# Patient Record
Sex: FEMALE | Race: WHITE | Employment: UNEMPLOYED | URBAN - METROPOLITAN AREA
[De-identification: names, ages, dates, MRNs, and addresses within clinical notes are randomized per-mention and may not be internally consistent; named-entity substitution may affect disease eponyms.]

---

## 2019-01-10 LAB
CREATININE, EXTERNAL: 0.72
LDL-C, EXTERNAL: 94

## 2019-03-28 ENCOUNTER — OFFICE VISIT (OUTPATIENT)
Dept: ENDOCRINOLOGY | Age: 34
End: 2019-03-28

## 2019-03-28 VITALS
BODY MASS INDEX: 27.94 KG/M2 | SYSTOLIC BLOOD PRESSURE: 128 MMHG | RESPIRATION RATE: 16 BRPM | DIASTOLIC BLOOD PRESSURE: 73 MMHG | WEIGHT: 178 LBS | HEART RATE: 86 BPM | HEIGHT: 67 IN

## 2019-03-28 DIAGNOSIS — R79.89 ELEVATED TESTOSTERONE LEVEL IN FEMALE: Primary | ICD-10-CM

## 2019-03-28 NOTE — PROGRESS NOTES
Endocrinology New Patient Visit    Chief Complaint: elevated testosterone level    Referring provider: Karina Macario MD    History of Present Illness:  Zachary Modi is a 35 y.o. female who is referred for an elevated testosterone level. This was checked in January due to an increase in hair growth with heavy cycles and pelvic pain. Total testosterone was 63. Other labs including CBC, CMP, lipids, and TSH were normal.     She denies a previous diagnosis of PCOS. Menarche was at age 8 and she had regular cycles during her teen years. She has been pregnant four times and has four children. Reports difficulty conceiving her first child (required Clomid) but had no problems with fertility after that. She does report very painful ovulation and heavy periods (with large clots) for the past few years. IUD helped this resolve but she is no longer on contraception because her  had a vasectomy. TVUS showed a cyst on her ovary which ruptured in late Feb. F/u US in March showed resolution of the cyst. She says she has always been [place]" but notes faster hair growth lately on her chin in particular- plucks every other day. Denies chest or back hair, or problems with acne. She does have a history of high triglycerides but this has resolved. No h/o GDM or problems with her blood sugar - glucose on her BMP was 90. She does drink a regular soda every day. Weight is fairly stable. She remembers gaining weight due to a two week course of steroids in 2016 but has since lost most of that. She has no personal or family history of adrenal disorders.      Review of Systems: as above, otherwise a 10 pt review is negative     Problem List:  Patient Active Problem List   Diagnosis Code    Post partum depression F53.0    Frequent headaches R51    Bilateral carpal tunnel syndrome G56.03    PVC (premature ventricular contraction) I49.3    Elevated testosterone level in female R79.89       Past Medical History:  Past Medical History: Diagnosis Date    Anxiety     Chest pain     Elevated testosterone level in female     Family history of skin cancer     Headache     Memory loss     Skipped beats     Snoring     Visual disturbance        Past Surgical History:  Past Surgical History:   Procedure Laterality Date    HX DILATION AND CURETTAGE  2012    post-partum    HX ORTHOPAEDIC  2008    HX TONSILLECTOMY         Social History:  Social History     Socioeconomic History    Marital status:      Spouse name: Not on file    Number of children: Not on file    Years of education: Not on file    Highest education level: Not on file   Occupational History    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     Comment: 4 kids (7, 5, 3, 9mo)   Social Needs    Financial resource strain: Not on file    Food insecurity:     Worry: Not on file     Inability: Not on file   [employer] needs:     Medical: Not on file     Non-medical: Not on file   Tobacco Use    Smoking status: Former Smoker     Packs/day: 0.50     Years: 8.00     Pack years: 4.00     Last attempt to quit: 9/1/2015     Years since quitting: 3.5    Smokeless tobacco: Never Used   Substance and Sexual Activity    Alcohol use:  Yes     Alcohol/week: 0.0 oz     Comment: Glass of wine every few months    Drug use: No    Sexual activity: Yes     Partners: Male     Birth control/protection: Surgical     Comment: Vasectomy   Lifestyle    Physical activity:     Days per week: Not on file     Minutes per session: Not on file    Stress: Not on file   Relationships    Social connections:     Talks on phone: Not on file     Gets together: Not on file     Attends Hinduism service: Not on file     Active member of club or organization: Not on file     Attends meetings of clubs or organizations: Not on file     Relationship status: Not on file    Intimate partner violence:     Fear of current or ex partner: Not on file     Emotionally abused: Not on file     Physically abused: Not on file     Forced sexual activity: Not on file   Other Topics Concern    Not on file   Social History Narrative    Not on file       Family History:  Family History   Problem Relation Age of Onset    Heart Attack Father 36        Fatal    Heart Disease Father     Diabetes Maternal Grandfather     Breast Cancer Maternal Grandmother 79    Cancer Maternal Grandmother     Heart Failure Paternal Grandfather     Heart Disease Paternal Grandfather     Parkinson's Disease Paternal Grandfather     Headache Other     Stroke Other        Medications:     Current Outpatient Medications:     MULTIVITAMIN PO, Take  by mouth daily. , Disp: , Rfl:     VITAMIN B COMPLEX PO, Take  by mouth daily. , Disp: , Rfl:     Allergies: Allergies   Allergen Reactions    Darvocet A500 [Propoxyphene N-Acetaminophen] Nausea Only    Penicillins Hives       Physical Examination:  Visit Vitals  /73   Pulse 86   Resp 16   Ht 5' 7\" (1.702 m)   Wt 178 lb (80.7 kg)   LMP 02/28/2019   BMI 27.88 kg/m²       Gen: no acute distress  HEENT: mucous membranes moist, normocephalic, non traumatic  Thyroid: no enlargement or nodules noted  CAD: normal rate, regular rhythm. No murmur rubs or gallops  PULM: clear to ausculation, no wheezes, rhonchis or rales. GI: soft non tender, non distended. EXT: no clubbing, cyanosis or edema  Neuro: grossly non focal  Skin: mild hirsutism on chin - hair is light in color  Psych: pleasant, good insight into her medical history      Clinical Data Review: There are no results available in [facility]. Pertinent lab results from outside records are transcribed in HPI and scanned into the medical record. Assessment and Plan:  Patient is a 35 y.o. female here for an elevated testosterone level. We reviewed possibly causes of this today.  The most common would be PCOS but I am not convinced she meets criteria for this diagnosis (+/-  Irregular periods, +/- ovarian cysts, + clinical signs of hyperandrogenism). Will r/o adrenal etiologies with a 17OHP and DHEA-S. If DHEA-S returns elevated she understands I will recommend an abdominal CT scan for further evaluation of her adrenal glands. Perhaps the cyst rupture caused a transient increase in testosterone, so I am rechecking this. We discussed potential therapies including spironolactone and/or metformin, however I do not think her symptoms are severe enough to warrant medication at this time. For now, continue spot treatment (plucking) for hair removal.     Orders Placed This Encounter    17-OH PROGESTERONE LCMS    DHEA SULFATE    HEMOGLOBIN A1C WITH EAG    TESTOSTERONE, TOTAL, FEMALE/CHILD      We will review these results and additional w/u (if indicated) via [employer]. I spent 45 minutes with the patient today and > 50% of the time was spent counseling the patient about causes and treatment of high testosterone levels. Thank you for the opportunity to participate in this patient's care.     Edy Bustamante MD  [facility]  [facility]

## 2019-04-01 LAB
17OHP SERPL-MCNC: 135 NG/DL
DHEA-S SERPL-MCNC: 355.1 UG/DL (ref 84.8–378)
EST. AVERAGE GLUCOSE BLD GHB EST-MCNC: 97 MG/DL
HBA1C MFR BLD: 5 % (ref 4.8–5.6)
TESTOST SERPL-MCNC: 29.4 NG/DL (ref 10–55)

## 2019-05-08 ENCOUNTER — HOSPITAL ENCOUNTER (OUTPATIENT)
Dept: MRI IMAGING | Age: 34
Discharge: HOME | End: 2019-05-08
Attending: ORTHOPAEDIC SURGERY
Payer: COMMERCIAL

## 2019-05-08 DIAGNOSIS — M22.41 CHONDROMALACIA OF RIGHT PATELLA: ICD-10-CM

## 2019-05-08 DIAGNOSIS — M23.91 DERANGEMENT OF COLLATERAL LIGAMENT OF RIGHT KNEE: ICD-10-CM

## 2019-05-08 PROCEDURE — 73721 MRI JNT OF LWR EXTRE W/O DYE: CPT

## 2019-07-03 ENCOUNTER — HOSPITAL ENCOUNTER (OUTPATIENT)
Dept: MRI IMAGING | Age: 34
Discharge: HOME | End: 2019-07-03
Attending: ORTHOPAEDIC SURGERY
Payer: COMMERCIAL

## 2019-07-03 DIAGNOSIS — M25.551 RIGHT HIP PAIN: ICD-10-CM

## 2019-07-03 PROCEDURE — 73721 MRI JNT OF LWR EXTRE W/O DYE: CPT

## 2019-08-06 ENCOUNTER — HOSPITAL ENCOUNTER (OUTPATIENT)
Dept: MRI IMAGING | Age: 34
Discharge: HOME | End: 2019-08-06
Attending: ORTHOPAEDIC SURGERY
Payer: COMMERCIAL

## 2019-08-06 ENCOUNTER — HOSPITAL ENCOUNTER (OUTPATIENT)
Dept: GENERAL RADIOLOGY | Age: 34
Discharge: HOME | End: 2019-08-06
Attending: ORTHOPAEDIC SURGERY
Payer: COMMERCIAL

## 2019-08-06 DIAGNOSIS — S73.191A TEAR OF RIGHT ACETABULAR LABRUM: ICD-10-CM

## 2019-08-06 PROCEDURE — A9575 INJ GADOTERATE MEGLUMI 0.1ML: HCPCS | Performed by: RADIOLOGY

## 2019-08-06 PROCEDURE — 74011250636 HC RX REV CODE- 250/636: Performed by: RADIOLOGY

## 2019-08-06 PROCEDURE — 73722 MRI JOINT OF LWR EXTR W/DYE: CPT

## 2019-08-06 PROCEDURE — 74011636320 HC RX REV CODE- 636/320: Performed by: RADIOLOGY

## 2019-08-06 PROCEDURE — 27093 INJECTION FOR HIP X-RAY: CPT

## 2019-08-06 RX ORDER — GADOTERATE MEGLUMINE 376.9 MG/ML
2 INJECTION INTRAVENOUS
Status: COMPLETED | OUTPATIENT
Start: 2019-08-06 | End: 2019-08-06

## 2019-08-06 RX ORDER — LIDOCAINE HYDROCHLORIDE 10 MG/ML
10 INJECTION, SOLUTION EPIDURAL; INFILTRATION; INTRACAUDAL; PERINEURAL
Status: COMPLETED | OUTPATIENT
Start: 2019-08-06 | End: 2019-08-06

## 2019-08-06 RX ADMIN — IOHEXOL 10 ML: 300 INJECTION, SOLUTION INTRAVENOUS at 14:12

## 2019-08-06 RX ADMIN — GADOTERATE MEGLUMINE 2 ML: 376.9 INJECTION INTRAVENOUS at 14:12

## 2019-08-06 RX ADMIN — LIDOCAINE HYDROCHLORIDE 5 ML: 10 INJECTION, SOLUTION EPIDURAL; INFILTRATION; INTRACAUDAL; PERINEURAL at 14:13

## 2021-06-23 ENCOUNTER — HOSPITAL ENCOUNTER (EMERGENCY)
Age: 36
Discharge: HOME | End: 2021-06-23
Attending: EMERGENCY MEDICINE
Payer: COMMERCIAL

## 2021-06-23 ENCOUNTER — APPOINTMENT (OUTPATIENT)
Dept: GENERAL RADIOLOGY | Age: 36
End: 2021-06-23
Attending: STUDENT IN AN ORGANIZED HEALTH CARE EDUCATION/TRAINING PROGRAM
Payer: COMMERCIAL

## 2021-06-23 VITALS
SYSTOLIC BLOOD PRESSURE: 135 MMHG | RESPIRATION RATE: 16 BRPM | OXYGEN SATURATION: 99 % | WEIGHT: 209 LBS | HEART RATE: 95 BPM | TEMPERATURE: 99.2 F | HEIGHT: 69 IN | DIASTOLIC BLOOD PRESSURE: 98 MMHG | BODY MASS INDEX: 30.96 KG/M2

## 2021-06-23 DIAGNOSIS — M25.552 LEFT HIP PAIN: ICD-10-CM

## 2021-06-23 DIAGNOSIS — S39.012A STRAIN OF LUMBAR REGION, INITIAL ENCOUNTER: Primary | ICD-10-CM

## 2021-06-23 PROCEDURE — 74011250636 HC RX REV CODE- 250/636: Performed by: STUDENT IN AN ORGANIZED HEALTH CARE EDUCATION/TRAINING PROGRAM

## 2021-06-23 PROCEDURE — 96372 THER/PROPH/DIAG INJ SC/IM: CPT

## 2021-06-23 PROCEDURE — 99283 EMERGENCY DEPT VISIT LOW MDM: CPT

## 2021-06-23 PROCEDURE — 73502 X-RAY EXAM HIP UNI 2-3 VIEWS: CPT

## 2021-06-23 RX ORDER — HYDROCODONE BITARTRATE AND ACETAMINOPHEN 5; 325 MG/1; MG/1
1 TABLET ORAL
Qty: 6 TABLET | Refills: 0 | Status: SHIPPED | OUTPATIENT
Start: 2021-06-23 | End: 2021-06-25

## 2021-06-23 RX ORDER — KETOROLAC TROMETHAMINE 30 MG/ML
30 INJECTION, SOLUTION INTRAMUSCULAR; INTRAVENOUS
Status: COMPLETED | OUTPATIENT
Start: 2021-06-23 | End: 2021-06-23

## 2021-06-23 RX ORDER — NAPROXEN 500 MG/1
500 TABLET ORAL 2 TIMES DAILY WITH MEALS
Qty: 20 TABLET | Refills: 0 | Status: SHIPPED | OUTPATIENT
Start: 2021-06-23 | End: 2021-07-03

## 2021-06-23 RX ADMIN — KETOROLAC TROMETHAMINE 30 MG: 30 INJECTION, SOLUTION INTRAMUSCULAR; INTRAVENOUS at 20:03
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
## 2021-06-24 NOTE — ED NOTES
The patient was discharged home by Dr. Foster Severance and Meredith Wallace rn in stable condition, accompanied by family. The patient is alert and oriented, is in no respiratory distress and has vital signs within normal limits . The patient's diagnosis, condition and treatment were explained to patient. The patient expressed understanding. No prescriptions given to pt. No work/school note given to pt. A discharge plan has been developed. A  was not involved in the process. Aftercare instructions were given to the patient. Pt given a wheelchair ride to the front of the ED. Family will transport pt home.

## 2022-03-15 ENCOUNTER — OFFICE VISIT (OUTPATIENT)
Dept: ORTHOPEDIC SURGERY | Age: 37
End: 2022-03-15
Payer: COMMERCIAL

## 2022-03-15 VITALS — BODY MASS INDEX: 27.32 KG/M2 | WEIGHT: 185 LBS

## 2022-03-15 DIAGNOSIS — S43.432A SUPERIOR GLENOID LABRUM LESION OF LEFT SHOULDER, INITIAL ENCOUNTER: ICD-10-CM

## 2022-03-15 DIAGNOSIS — M25.512 LEFT SHOULDER PAIN, UNSPECIFIED CHRONICITY: Primary | ICD-10-CM

## 2022-03-15 PROCEDURE — 99214 OFFICE O/P EST MOD 30 MIN: CPT | Performed by: ORTHOPAEDIC SURGERY
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
## 2022-03-16 DIAGNOSIS — S43.432A SUPERIOR GLENOID LABRUM LESION OF LEFT SHOULDER, INITIAL ENCOUNTER: Primary | ICD-10-CM

## 2022-03-16 RX ORDER — MELOXICAM 7.5 MG/1
7.5 TABLET ORAL DAILY
Qty: 15 TABLET | Refills: 1 | Status: SHIPPED | OUTPATIENT
Start: 2022-03-16

## 2022-03-21 RX ORDER — LORAZEPAM 1 MG/1
1 TABLET ORAL
Qty: 1 TABLET | Refills: 0 | Status: SHIPPED | OUTPATIENT
Start: 2022-03-21

## 2022-04-01 ENCOUNTER — OFFICE VISIT (OUTPATIENT)
Dept: ORTHOPEDIC SURGERY | Age: 37
End: 2022-04-01
Payer: COMMERCIAL

## 2022-04-01 VITALS — BODY MASS INDEX: 29.03 KG/M2 | WEIGHT: 185 LBS | HEIGHT: 67 IN

## 2022-04-01 DIAGNOSIS — M54.2 NECK PAIN: ICD-10-CM

## 2022-04-01 DIAGNOSIS — G89.29 CHRONIC LEFT SHOULDER PAIN: ICD-10-CM

## 2022-04-01 DIAGNOSIS — M25.512 ACUTE PAIN OF LEFT SHOULDER: ICD-10-CM

## 2022-04-01 DIAGNOSIS — M19.012 ARTHRITIS OF LEFT ACROMIOCLAVICULAR JOINT: ICD-10-CM

## 2022-04-01 DIAGNOSIS — M25.512 CHRONIC LEFT SHOULDER PAIN: ICD-10-CM

## 2022-04-01 DIAGNOSIS — M54.2 CERVICALGIA: Primary | ICD-10-CM

## 2022-04-01 PROCEDURE — 99214 OFFICE O/P EST MOD 30 MIN: CPT | Performed by: ORTHOPAEDIC SURGERY

## 2022-04-01 RX ORDER — GABAPENTIN 300 MG/1
300 CAPSULE ORAL
Qty: 30 CAPSULE | Refills: 0 | Status: SHIPPED | OUTPATIENT
Start: 2022-04-01

## 2022-04-01 NOTE — PROGRESS NOTES
Abagail Kocher (: 1985) is a 39 y.o. female, patient, here for evaluation of the following chief complaint(s):  Shoulder Pain (left)       HPI:    He was last seen for left shoulder pain on 3/15/2022. Since then, the patient did have an MRI performed of her left shoulder on 3/22/2022. The patient states that her pain levels gotten worse since her last visit. She rates the severity of her left shoulder pain as an 8 or 9 out of 10. She describes her pain as stabbing, throbbing, aching, and constant. Her pain does make it difficult for her to go to sleep and does wake her up from sleep. She has been experiencing some bruising, numbness, tingling, and weakness in her left shoulder and upper extremity. Patient has been taking anti-inflammatory medication with little to no pain relief. Left shoulder MRI images and results were independently reviewed and they were consistent with mild acromioclavicular osteoarthritis with trace fluid in the subacromial subdeltoid bursa. Allergies   Allergen Reactions    Darvocet A500 [Propoxyphene N-Acetaminophen] Nausea Only    Keflex [Cephalexin] Rash    Penicillins Hives       Current Outpatient Medications   Medication Sig    gabapentin (Neurontin) 300 mg capsule Take 1 Capsule by mouth nightly. Max Daily Amount: 300 mg.  LORazepam (ATIVAN) 1 mg tablet Take 1 Tablet by mouth every four (4) hours as needed for Anxiety (take one hour before MRI Procedure). Max Daily Amount: 6 mg.  meloxicam (MOBIC) 7.5 mg tablet Take 1 Tablet by mouth daily.  MULTIVITAMIN PO Take  by mouth daily. No current facility-administered medications for this visit.        Past Medical History:   Diagnosis Date    Anxiety     Chest pain     Elevated testosterone level in female     Family history of skin cancer     Headache     Memory loss     Skipped beats     Snoring     Visual disturbance         Past Surgical History:   Procedure Laterality Date    HX DILATION AND CURETTAGE  2012    post-partum    HX ORTHOPAEDIC  2008    HX TONSILLECTOMY         Family History   Problem Relation Age of Onset    Heart Attack Father 36        Fatal    Heart Disease Father     Diabetes Maternal Grandfather     Breast Cancer Maternal Grandmother 79    Cancer Maternal Grandmother     Heart Failure Paternal Grandfather     Heart Disease Paternal Grandfather     Parkinson's Disease Paternal Grandfather     Headache Other     Stroke Other         Social History     Socioeconomic History    Marital status: LEGALLY      Spouse name: Not on file    Number of children: Not on file    Years of education: Not on file    Highest education level: Not on file   Occupational History    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     Comment: 4 kids (7, 5, 3, 9mo)   Tobacco Use    Smoking status: Former Smoker     Packs/day: 0.50     Years: 8.00     Pack years: 4.00     Quit date: 2015     Years since quittin.5    Smokeless tobacco: Never Used   Substance and Sexual Activity    Alcohol use: Yes     Alcohol/week: 0.0 standard drinks     Comment: Glass of wine every few months    Drug use: No    Sexual activity: Yes     Partners: Male     Birth control/protection: Surgical     Comment: Vasectomy   Other Topics Concern    Not on file   Social History Narrative    Not on file     Social Determinants of Health     Financial Resource Strain:     Difficulty of Paying Living Expenses: Not on file   Food Insecurity:     Worried About Running Out of Food in the Last Year: Not on file    Virgilio of Food in the Last Year: Not on file   Transportation Needs:     Lack of Transportation (Medical): Not on file    Lack of Transportation (Non-Medical):  Not on file   Physical Activity:     Days of Exercise per Week: Not on file    Minutes of Exercise per Session: Not on file   Stress:     Feeling of Stress : Not on file   Social Connections:     Frequency of Communication with Friends and Family: Not on file    Frequency of Social Gatherings with Friends and Family: Not on file    Attends Faith Services: Not on file    Active Member of Clubs or Organizations: Not on file    Attends Club or Organization Meetings: Not on file    Marital Status: Not on file   Intimate Partner Violence:     Fear of Current or Ex-Partner: Not on file    Emotionally Abused: Not on file    Physically Abused: Not on file    Sexually Abused: Not on file   Housing Stability:     Unable to Pay for Housing in the Last Year: Not on file    Number of [place] in the Last Year: Not on file    Unstable Housing in the Last Year: Not on file       Review of Systems   All other systems reviewed and are negative. Vitals:  Ht 5' 7\" (1.702 m)   Wt 185 lb (83.9 kg)   BMI 28.98 kg/m²    Body mass index is 28.98 kg/m². Ortho Exam     The patient is well-developed and well-nourished. The patient presents today in alert and oriented x3 with a normal mood and affect. The patient stands with a normal weightbearing line and walks with a normal gait. Left shoulder, the patient sits with the scapula protracted and depressed. They are tender to palpation over the anterior supraspinatus and proximal biceps. They also are tender to palpation over the acromioclavicular joint and have discomfort with cross adduction testing. There is mild palpable crepitus in the subacromial space with ranging. The patient has limited range of motion, and discomfort with above shoulder range of motion. The patient has discomfort with Neer and Fregoso impingement maneuvers and Whipple testing. The shoulder is stable on exam. They have 5/5 strength with internal and external rotation and are neurovascularly intact distally. There is no erythema, warmth or skin lesions present. Neck rotation and motion and some pain that radiates into her left shoulder and down her left arm with Spurling's maneuver.   Her motor and sensory exam are intact. ASSESSMENT/PLAN:      1. Cervicalgia  -     XR SPINE CERV PA LAT ODONT 3 V MAX; Future  -     gabapentin (Neurontin) 300 mg capsule; Take 1 Capsule by mouth nightly. Max Daily Amount: 300 mg., Print, Disp-30 Capsule, R-0  2. Chronic left shoulder pain  3. Acute pain of left shoulder  4. Arthritis of left acromioclavicular joint  5. Neck pain     XR Results (most recent):  Results from Appointment encounter on 04/01/22    XR SPINE CERV PA LAT ODONT 3 V MAX    Narrative  2 view x-rays of her cervical spine show complete loss of her cervical lordosis. There is no evidence of any significant degenerative disc disease although there is a small osteophyte at C5-6. Below is the assessment and plan developed based on review of pertinent history, physical exam, labs, studies, and medications. We discussed the patient's ongoing left shoulder pain and we independently reviewed her MRI images and results and they were consistent with  mild acromioclavicular osteoarthritis with trace fluid in the subacromial subdeltoid bursa. We also obtained x-rays of her cervical spine today which were consistent with the loss of her cervical lordosis but no significant degenerative disc disease. The possible treatment options were discussed with the patient and we elected to treat her pain conservatively with rest, ice, activity modification and gabapentin. We talked about a Medrol Dosepak but she states the last time she took a Medrol Dosepak she gained 35 pounds and was unable to lose the weight. She would therefore like to avoid that. We did talk about formal physical therapy. I will see her back in 3 to 4 weeks for reevaluation. Return in about 4 weeks (around 4/29/2022). An electronic signature was used to authenticate this note.   -- Idalia Carrasquillo MD

## 2022-04-12 DIAGNOSIS — M19.012 ARTHRITIS OF LEFT ACROMIOCLAVICULAR JOINT: Primary | ICD-10-CM

## 2022-04-12 RX ORDER — DICLOFENAC SODIUM 20 MG/G
2 SOLUTION TOPICAL
Qty: 1 PACKET | Refills: 1 | Status: SHIPPED | OUTPATIENT
Start: 2022-04-12

## 2022-04-26 ENCOUNTER — OFFICE VISIT (OUTPATIENT)
Dept: ORTHOPEDIC SURGERY | Age: 37
End: 2022-04-26
Payer: COMMERCIAL

## 2022-04-26 VITALS — BODY MASS INDEX: 29.03 KG/M2 | HEIGHT: 67 IN | WEIGHT: 185 LBS

## 2022-04-26 DIAGNOSIS — M54.12 CERVICAL RADICULAR PAIN: ICD-10-CM

## 2022-04-26 DIAGNOSIS — M54.2 CERVICALGIA: ICD-10-CM

## 2022-04-26 DIAGNOSIS — G89.29 CHRONIC LEFT SHOULDER PAIN: ICD-10-CM

## 2022-04-26 DIAGNOSIS — M25.512 CHRONIC LEFT SHOULDER PAIN: ICD-10-CM

## 2022-04-26 DIAGNOSIS — M54.12 CERVICAL RADICULITIS: Primary | ICD-10-CM

## 2022-04-26 DIAGNOSIS — M54.2 NECK PAIN: ICD-10-CM

## 2022-04-26 PROCEDURE — 99213 OFFICE O/P EST LOW 20 MIN: CPT | Performed by: ORTHOPAEDIC SURGERY

## 2022-04-27 NOTE — PROGRESS NOTES
Lee Simons (: 1985) is a 39 y.o. female, patient, here for evaluation of the following chief complaint(s):  Shoulder Pain (left) and Neck Pain       HPI:    She was last seen for her neck and left shoulder pain on 2022. Prior to that visit, the patient did have an MRI performed on her left shoulder. The patient states that her pain levels the same as it was a last visit. She rates the severity of her discomfort as a 7 out of 10. She describes her pain as sharp, throbbing, aching, and constant. Her pain does make it difficult for her to go to sleep and does wake her up from sleep. The patient has been taking anti-inflammatory medication for discomfort as needed. She has been attending formal physical therapy as well. Unfortunately, the medication and formal PT have not helped reduce her discomfort. Left shoulder MRI images and results were independently reviewed and they were consistent with mild acromioclavicular osteoarthritis with trace fluid in the subacromial subdeltoid bursa. Allergies   Allergen Reactions    Darvocet A500 [Propoxyphene N-Acetaminophen] Nausea Only    Keflex [Cephalexin] Rash    Penicillins Hives       Current Outpatient Medications   Medication Sig    diclofenac sodium (Pennsaid) 2 % sopk 2 Pump(s) by Apply Externally route two (2) times daily as needed for Pain.  gabapentin (Neurontin) 300 mg capsule Take 1 Capsule by mouth nightly. Max Daily Amount: 300 mg.  LORazepam (ATIVAN) 1 mg tablet Take 1 Tablet by mouth every four (4) hours as needed for Anxiety (take one hour before MRI Procedure). Max Daily Amount: 6 mg.  meloxicam (MOBIC) 7.5 mg tablet Take 1 Tablet by mouth daily.  MULTIVITAMIN PO Take  by mouth daily. No current facility-administered medications for this visit.        Past Medical History:   Diagnosis Date    Anxiety     Chest pain     Elevated testosterone level in female     Family history of skin cancer     Headache     Memory loss     Skipped beats     Snoring     Visual disturbance         Past Surgical History:   Procedure Laterality Date    HX DILATION AND CURETTAGE  2012    post-partum    HX ORTHOPAEDIC  2008    HX TONSILLECTOMY         Family History   Problem Relation Age of Onset    Heart Attack Father 36        Fatal    Heart Disease Father     Diabetes Maternal Grandfather     Breast Cancer Maternal Grandmother 79    Cancer Maternal Grandmother     Heart Failure Paternal Grandfather     Heart Disease Paternal Grandfather     Parkinson's Disease Paternal Grandfather     Headache Other     Stroke Other         Social History     Socioeconomic History    Marital status: LEGALLY      Spouse name: Not on file    Number of children: Not on file    Years of education: Not on file    Highest education level: Not on file   Occupational History    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     Comment: 4 kids (7, 5, 3, 9mo)   Tobacco Use    Smoking status: Former Smoker     Packs/day: 0.50     Years: 8.00     Pack years: 4.00     Quit date: 2015     Years since quittin.6    Smokeless tobacco: Never Used   Substance and Sexual Activity    Alcohol use: Yes     Alcohol/week: 0.0 standard drinks     Comment: Glass of wine every few months    Drug use: No    Sexual activity: Yes     Partners: Male     Birth control/protection: Surgical     Comment: Vasectomy   Other Topics Concern    Not on file   Social History Narrative    Not on file     Social Determinants of Health     Financial Resource Strain:     Difficulty of Paying Living Expenses: Not on file   Food Insecurity:     Worried About Running Out of Food in the Last Year: Not on file    Virgilio of Food in the Last Year: Not on file   Transportation Needs:     Lack of Transportation (Medical): Not on file    Lack of Transportation (Non-Medical):  Not on file   Physical Activity:     Days of Exercise per Week: Not on file    Minutes of Exercise per Session: Not on file   Stress:     Feeling of Stress : Not on file   Social Connections:     Frequency of Communication with Friends and Family: Not on file    Frequency of Social Gatherings with Friends and Family: Not on file    Attends Cheondoism Services: Not on file    Active Member of Clubs or Organizations: Not on file    Attends Club or Organization Meetings: Not on file    Marital Status: Not on file   Intimate Partner Violence:     Fear of Current or Ex-Partner: Not on file    Emotionally Abused: Not on file    Physically Abused: Not on file    Sexually Abused: Not on file   Housing Stability:     Unable to Pay for Housing in the Last Year: Not on file    Number of [place] in the Last Year: Not on file    Unstable Housing in the Last Year: Not on file       Review of Systems   All other systems reviewed and are negative. Vitals:  Ht 5' 7\" (1.702 m)   Wt 185 lb (83.9 kg)   BMI 28.98 kg/m²    Body mass index is 28.98 kg/m². Ortho Exam     The patient is well-developed and well-nourished. The patient presents today in alert and oriented x3 with a normal mood and affect. The patient stands with a normal weightbearing line and walks with a normal gait.     Left shoulder, the patient sits with the scapula protracted and depressed. They are tender to palpation over the anterior supraspinatus and proximal biceps. They also are tender to palpation over the acromioclavicular joint and have discomfort with cross adduction testing. There is mild palpable crepitus in the subacromial space with ranging. The patient has limited range of motion, and discomfort with above shoulder range of motion. The patient has discomfort with Neer and Fregoso impingement maneuvers and Whipple testing. The shoulder is stable on exam. They have 5/5 strength with internal and external rotation and are neurovascularly intact distally.  There is no erythema, warmth or skin lesions present.     Neck rotation and motion and some pain that radiates into her left shoulder and down her left arm with Spurling's maneuver. Her motor and sensory exam are intact. ASSESSMENT/PLAN:      1. Cervical radiculitis  -     MRI CERV SPINE WO CONT; Future  2. Neck pain  3. Cervical radicular pain  4. Cervicalgia  5. Chronic left shoulder pain       Below is the assessment and plan developed based on review of pertinent history, physical exam, labs, studies, and medications. We discussed the patient's ongoing left shoulder pain and her signs, symptoms, physical exam, description of her pain, and past x-rays are consistent with her left shoulder pain being related to her cervical spine and cervical radicular pain and radiculopathy. The possible treatment options were discussed with the patient and because of the duration of her increased pain, no improvement with multiple modalities of conservative management including both formal physical therapy and an at-home exercise program, her physical exam, description of her pain, past x-rays, and her inability to complete daily living activities without significant discomfort we elected to obtain an MRI of her cervical spine to further evaluate the severity of her cervical radiculopathy and causation for her cervical radicular pain. The MRI images and results will be used to help us determine the most appropriate treatment plan and and surgical planning if surgical intervention is necessary. The risks and benefits of the MRI were discussed in detail with the patient and she would like to proceed. We will schedule this at her convenience. I will see her back after MRI is complete to discuss the images, results, and further treatment options. If her MRI images are consistent with needing surgical intervention we would then refer her to our spine specialist Dr. Trevon Zurita and his team at that time.   In the interim, I did encourage her to ice when possible, modify her activity level based on her left shoulder and neck pain, and use anti-inflammatory medication when necessary. The patient will continue to work on range of motion, strengthening, stretching, and traction exercises with an at-home exercise program as pain tolerates. I will see her back as noted above after her cervical spine MRI is complete. **We will obtain an MRI for more information to determine the best treatment plan moving forward and help us prepare for surgical intervention if necessary. **    Return in about 2 weeks (around 5/10/2022) for After her cervical spine MRI is complete. An electronic signature was used to authenticate this note.   -- Hadley Severe, MD